# Patient Record
Sex: MALE | Race: WHITE | NOT HISPANIC OR LATINO | Employment: UNEMPLOYED | URBAN - METROPOLITAN AREA
[De-identification: names, ages, dates, MRNs, and addresses within clinical notes are randomized per-mention and may not be internally consistent; named-entity substitution may affect disease eponyms.]

---

## 2017-12-13 ENCOUNTER — OFFICE VISIT (OUTPATIENT)
Dept: FAMILY MEDICINE | Facility: HOSPITAL | Age: 62
End: 2017-12-13
Attending: FAMILY MEDICINE
Payer: MEDICARE

## 2017-12-13 VITALS
BODY MASS INDEX: 24.75 KG/M2 | WEIGHT: 148.56 LBS | HEART RATE: 70 BPM | DIASTOLIC BLOOD PRESSURE: 84 MMHG | SYSTOLIC BLOOD PRESSURE: 129 MMHG | HEIGHT: 65 IN

## 2017-12-13 DIAGNOSIS — F25.0 SCHIZOAFFECTIVE DISORDER, BIPOLAR TYPE: ICD-10-CM

## 2017-12-13 DIAGNOSIS — F31.9 BIPOLAR 1 DISORDER: ICD-10-CM

## 2017-12-13 DIAGNOSIS — Z76.89 ENCOUNTER TO ESTABLISH CARE WITH NEW DOCTOR: Primary | ICD-10-CM

## 2017-12-13 DIAGNOSIS — F41.9 ANXIETY: ICD-10-CM

## 2017-12-13 DIAGNOSIS — F33.9 EPISODE OF RECURRENT MAJOR DEPRESSIVE DISORDER, UNSPECIFIED DEPRESSION EPISODE SEVERITY: ICD-10-CM

## 2017-12-13 DIAGNOSIS — Z00.00 HEALTHCARE MAINTENANCE: ICD-10-CM

## 2017-12-13 DIAGNOSIS — F31.9 BIPOLAR DEPRESSION: Primary | ICD-10-CM

## 2017-12-13 PROBLEM — F32.9 MAJOR DEPRESSIVE DISORDER: Status: ACTIVE | Noted: 2017-12-13

## 2017-12-13 PROCEDURE — 99204 OFFICE O/P NEW MOD 45 MIN: CPT | Performed by: FAMILY MEDICINE

## 2017-12-13 RX ORDER — RISPERIDONE 25 MG/2 ML
25 KIT INTRAMUSCULAR
Qty: 2 VIAL | Refills: 3 | Status: SHIPPED | OUTPATIENT
Start: 2017-12-13 | End: 2018-04-03 | Stop reason: SDUPTHER

## 2017-12-13 RX ORDER — BETAMETHASONE DIPROPIONATE 0.5 MG/G
OINTMENT TOPICAL
COMMUNITY
Start: 2017-12-12

## 2017-12-13 RX ORDER — FLUCONAZOLE 200 MG/1
TABLET ORAL
COMMUNITY
Start: 2017-11-06 | End: 2017-12-13

## 2017-12-13 RX ORDER — TRAZODONE HYDROCHLORIDE 50 MG/1
TABLET ORAL
COMMUNITY
Start: 2017-12-04 | End: 2018-04-03 | Stop reason: SDUPTHER

## 2017-12-13 RX ORDER — DIVALPROEX SODIUM 250 MG/1
TABLET, DELAYED RELEASE ORAL
COMMUNITY
Start: 2017-11-24 | End: 2018-02-07 | Stop reason: SDUPTHER

## 2017-12-13 RX ORDER — GABAPENTIN 300 MG/1
CAPSULE ORAL
COMMUNITY
Start: 2017-11-24

## 2017-12-13 RX ORDER — RISPERIDONE 25 MG/2 ML
KIT INTRAMUSCULAR
COMMUNITY
Start: 2017-11-24 | End: 2017-12-13 | Stop reason: SDUPTHER

## 2017-12-13 NOTE — PROGRESS NOTES
Subjective:       Patient ID: Epifanio Puga is a 62 y.o. male.    Chief Complaint: Establish Care    62 y.o. Male with PMHx of depression and anxiety who is here to establish care after moving in with sister. He previously lived by himself, was on disability, and received assistance from home health in [place], [place]. Patient complains of rash on BL UE, BL LE, and abdomen that is improving with application of Betamethasone. Patient has history of unknow psychiatric problem which he was hospitalized for at [facility] in [place], [place], for 10 days after stating he wanted to kill himself. Today, patient reports compliance with all medications with assistance from his sister and denies any significant side effects. Patient denies SI, HI, and depression. He does state that he has experienced periods of time where he did not have to sleep for 72 hours and he reports periods with extreme energy. He denies any problems with his appetite currently. He states that his anxiety has been well controlled on the current medication regimen.       Review of Systems   Constitutional: Negative for unexpected weight change.   Respiratory: Negative for shortness of breath.    Cardiovascular: Negative for chest pain and palpitations.   Skin: Positive for rash.   Psychiatric/Behavioral: Negative for dysphoric mood and self-injury.   All other systems reviewed and are negative.      Objective:      Vitals:    12/13/17 1026   BP: 129/84   Pulse: 70     Physical Exam   Constitutional: He is oriented to person, place, and time. He appears well-developed and well-nourished. No distress.   HENT:   Head: Normocephalic and atraumatic.   Right Ear: External ear normal.   Left Ear: External ear normal.   Nose: Nose normal.   Mouth/Throat: Oropharynx is clear and moist.   Eyes: Conjunctivae and EOM are normal. Right eye exhibits no discharge. Left eye exhibits no discharge.   Neck: Normal range of motion. Neck supple.    Cardiovascular: Normal rate, regular rhythm, normal heart sounds and intact distal pulses.  Exam reveals no gallop and no friction rub.    No murmur heard.  Pulmonary/Chest: Effort normal and breath sounds normal. No respiratory distress. He has no wheezes. He has no rales. He exhibits no tenderness.   Abdominal: Soft. Bowel sounds are normal. He exhibits no distension. There is no tenderness.   Musculoskeletal: Normal range of motion. He exhibits no edema.   Neurological: He is alert and oriented to person, place, and time.   Skin: Skin is warm and dry. Rash noted.   Psychiatric: He has a normal mood and affect. His behavior is normal. Thought content normal. He is not actively hallucinating. He is attentive.       Assessment:       1. Encounter to establish care with new doctor    2. Episode of recurrent major depressive disorder, unspecified depression episode severity    3. Anxiety    4. Bipolar 1 disorder    5. Healthcare maintenance        Plan:       Encounter to establish care with new doctor    Episode of recurrent major depressive disorder, unspecified depression episode severity  -     Comprehensive metabolic panel; Future; Expected date: 12/13/2017  -     CBC auto differential; Future; Expected date: 12/13/2017  -     Cancel: Ambulatory referral to Psychiatry  -     RISPERDAL CONSTA 25 mg/2 mL injection; Inject 2 mLs (25 mg total) into the muscle every 14 (fourteen) days.  Dispense: 2 vial; Refill: 3  -     Ambulatory referral to Psychiatry    Anxiety  -     Comprehensive metabolic panel; Future; Expected date: 12/13/2017  -     CBC auto differential; Future; Expected date: 12/13/2017  -     Cancel: Ambulatory referral to Psychiatry  -     RISPERDAL CONSTA 25 mg/2 mL injection; Inject 2 mLs (25 mg total) into the muscle every 14 (fourteen) days.  Dispense: 2 vial; Refill: 3  -     Ambulatory referral to Psychiatry    Bipolar 1 disorder  -     Comprehensive metabolic panel; Future; Expected date:  12/13/2017  -     CBC auto differential; Future; Expected date: 12/13/2017  -     Cancel: Ambulatory referral to Psychiatry  -     RISPERDAL CONSTA 25 mg/2 mL injection; Inject 2 mLs (25 mg total) into the muscle every 14 (fourteen) days.  Dispense: 2 vial; Refill: 3  -     Ambulatory referral to Psychiatry    Healthcare maintenance  -     Hepatitis C antibody; Future; Expected date: 12/13/2017      Return in about 2 months (around 2/13/2018).        Pt discussed with Dr. George.    Home health will be initiated.    Lynne Ramirez MD

## 2018-01-09 ENCOUNTER — OFFICE VISIT (OUTPATIENT)
Dept: FAMILY MEDICINE | Facility: HOSPITAL | Age: 63
End: 2018-01-09
Attending: FAMILY MEDICINE
Payer: MEDICARE

## 2018-01-09 VITALS
HEIGHT: 65 IN | HEART RATE: 94 BPM | SYSTOLIC BLOOD PRESSURE: 130 MMHG | DIASTOLIC BLOOD PRESSURE: 82 MMHG | BODY MASS INDEX: 26.33 KG/M2 | WEIGHT: 158.06 LBS

## 2018-01-09 DIAGNOSIS — J06.9 VIRAL URI: Primary | ICD-10-CM

## 2018-01-09 PROCEDURE — 99213 OFFICE O/P EST LOW 20 MIN: CPT | Performed by: FAMILY MEDICINE

## 2018-01-09 NOTE — PROGRESS NOTES
Subjective:       Patient ID: Epifanio Puga is a 62 y.o. male.    Chief Complaint: Fever; Cough; Nasal Congestion; Insomnia; and Dizziness    Fever    Associated symptoms include congestion, coughing, headaches and nausea. Pertinent negatives include no abdominal pain, chest pain, diarrhea, ear pain, urinary pain, vomiting or wheezing.   Cough   Associated symptoms include chills, a fever, headaches, myalgias and rhinorrhea. Pertinent negatives include no chest pain, ear pain, shortness of breath or wheezing.   Dizziness:    Associated symptoms: a fever, headaches and nausea.no ear pain, no vomiting, no diaphoresis, no light-headedness and no chest pain.     Pt is a 62 ear old male with a PMHx of depression and anxiety who presents with a productive cough with clear sputum, congestion, and subjective fever since Sunday. Pt has not taken his temperature. Pt reports associated headache, chills, myalgias, nausea, and dizziness when he lays on his left side and is relieved with lying flat on his back or on his right side. He denies vomiting and sore throat. Pt has been taking thermaflu since Sunday without improvement. He states that yesterday his chills worsened, which prompted him to come into today. Pt denies any sick contacts. Pt received the flu shot in October.     Review of Systems   Constitutional: Positive for chills and fever. Negative for diaphoresis.   HENT: Positive for congestion and rhinorrhea. Negative for ear pain, sinus pain and sneezing.    Eyes: Negative for discharge and itching.   Respiratory: Positive for cough. Negative for shortness of breath and wheezing.    Cardiovascular: Negative for chest pain and leg swelling.   Gastrointestinal: Positive for nausea. Negative for abdominal distention, abdominal pain, constipation, diarrhea and vomiting.   Genitourinary: Negative for difficulty urinating, dysuria and hematuria.   Musculoskeletal: Positive for myalgias. Negative for arthralgias.    Neurological: Positive for dizziness and headaches. Negative for light-headedness and numbness.   Hematological: Negative for adenopathy.       Objective:      Vitals:    01/09/18 0953   BP: 130/82   Pulse: 94     Physical Exam   Constitutional: He appears well-developed and well-nourished.   HENT:   Head: Normocephalic and atraumatic.   Right Ear: Tympanic membrane normal.   Left Ear: Tympanic membrane normal.   Eyes: EOM are normal. Pupils are equal, round, and reactive to light.   Neck: Normal range of motion. Neck supple.   Cardiovascular: Normal rate and regular rhythm.    Pulmonary/Chest: Effort normal and breath sounds normal.   Abdominal: Soft. Bowel sounds are normal.   Skin: Skin is warm and dry.       Assessment:       1. Viral URI        Plan:       Viral URI  -flu swab negative  -pt remains afebrile  -may take OTC medicine    Return if symptoms worsen or fail to improve.      Patient discussed with Dr. George.    Lynne Ramirez MD

## 2018-02-07 ENCOUNTER — OFFICE VISIT (OUTPATIENT)
Dept: FAMILY MEDICINE | Facility: HOSPITAL | Age: 63
End: 2018-02-07
Attending: FAMILY MEDICINE
Payer: MEDICARE

## 2018-02-07 VITALS
SYSTOLIC BLOOD PRESSURE: 128 MMHG | BODY MASS INDEX: 25.75 KG/M2 | HEART RATE: 77 BPM | HEIGHT: 65 IN | DIASTOLIC BLOOD PRESSURE: 77 MMHG | WEIGHT: 154.56 LBS

## 2018-02-07 DIAGNOSIS — F33.41 RECURRENT MAJOR DEPRESSIVE DISORDER, IN PARTIAL REMISSION: Primary | ICD-10-CM

## 2018-02-07 DIAGNOSIS — F41.9 ANXIETY: ICD-10-CM

## 2018-02-07 PROCEDURE — 99213 OFFICE O/P EST LOW 20 MIN: CPT | Performed by: FAMILY MEDICINE

## 2018-02-07 RX ORDER — DIVALPROEX SODIUM 250 MG/1
250 TABLET, DELAYED RELEASE ORAL EVERY 12 HOURS
Qty: 180 TABLET | Refills: 0 | Status: SHIPPED | OUTPATIENT
Start: 2018-02-07 | End: 2018-04-03 | Stop reason: SDUPTHER

## 2018-02-07 NOTE — PROGRESS NOTES
Subjective:       Patient ID: Epifanio Puga is a 62 y.o. male.    Chief Complaint: Follow-up    HPI   62 year old male with a history of MDD and Anxiety that presents for follow up. His is with his sister. He is doing well per patient and sister. He denies any current complaints. He will see the Psych MD in April 2017. He is having problems sleeping however he states that he will wake up twice during the night to bring the dogs outside. I have encouraged him to set an alarm and bring dogs out once during the night and before he goes to sleep.     Review of Systems   Constitutional: Negative for fever.   HENT: Negative for sore throat.    Eyes: Negative for visual disturbance.   Respiratory: Negative for shortness of breath.    Cardiovascular: Negative for chest pain and palpitations.   Gastrointestinal: Negative for diarrhea and vomiting.   Skin: Negative for rash.   Neurological: Negative for headaches.   Psychiatric/Behavioral: Positive for sleep disturbance. Negative for behavioral problems and hallucinations.       Objective:      Vitals:    02/07/18 1049   BP: 128/77   Pulse: 77     Physical Exam   Constitutional: He is oriented to person, place, and time. He appears well-developed and well-nourished.   HENT:   Head: Normocephalic and atraumatic.   Eyes: Pupils are equal, round, and reactive to light.   Neck: Normal range of motion.   Cardiovascular: Normal rate and regular rhythm.    No murmur heard.  Pulmonary/Chest: Effort normal. No respiratory distress.   Abdominal: Soft. He exhibits no distension. There is no tenderness.   Musculoskeletal: Normal range of motion.   Neurological: He is alert and oriented to person, place, and time.   Skin: Skin is warm and dry.       Assessment:       1. Recurrent major depressive disorder, in partial remission    2. Anxiety        Plan:       Recurrent major depressive disorder, in partial remission  -     divalproex (DEPAKOTE) 250 MG EC tablet; Take 1 tablet (250 mg  total) by mouth every 12 (twelve) hours.  Dispense: 180 tablet; Refill: 0    Anxiety      Follow-up in about 3 months (around 5/7/2018).      Pt discussed with Dr. Reeves.    Lynne Ramirez MD

## 2018-02-08 NOTE — PROGRESS NOTES
I assume primary medical responsibility for this patient, I have reviewed the case history, findings, diagnosis and treatment plan with the resident and agree that the care is reasonable and necessary. This service has been performed by a resident without the presence of a teaching physician under the primary care exception  Marta Reeves  2/8/2018

## 2018-04-03 ENCOUNTER — HOSPITAL ENCOUNTER (OUTPATIENT)
Dept: CARDIOLOGY | Facility: CLINIC | Age: 63
Discharge: HOME | End: 2018-04-03
Payer: MEDICARE

## 2018-04-03 ENCOUNTER — OFFICE VISIT (OUTPATIENT)
Dept: PSYCHIATRY | Facility: CLINIC | Age: 63
End: 2018-04-03
Payer: MEDICARE

## 2018-04-03 VITALS
BODY MASS INDEX: 25.77 KG/M2 | HEART RATE: 72 BPM | SYSTOLIC BLOOD PRESSURE: 126 MMHG | WEIGHT: 154.88 LBS | DIASTOLIC BLOOD PRESSURE: 68 MMHG

## 2018-04-03 DIAGNOSIS — F79 INTELLECTUAL DISABILITY: ICD-10-CM

## 2018-04-03 DIAGNOSIS — F31.9 BIPOLAR 1 DISORDER: Primary | ICD-10-CM

## 2018-04-03 DIAGNOSIS — R06.09 DYSPNEA ON EXERTION: ICD-10-CM

## 2018-04-03 DIAGNOSIS — Z79.899 LONG TERM CURRENT USE OF ANTIPSYCHOTIC MEDICATION: ICD-10-CM

## 2018-04-03 DIAGNOSIS — G62.9 NEUROPATHY: ICD-10-CM

## 2018-04-03 DIAGNOSIS — G47.00 INSOMNIA, UNSPECIFIED TYPE: ICD-10-CM

## 2018-04-03 DIAGNOSIS — F41.9 ANXIETY: ICD-10-CM

## 2018-04-03 DIAGNOSIS — F22 PARANOIA: ICD-10-CM

## 2018-04-03 DIAGNOSIS — R14.0 ABDOMINAL DISTENSION: ICD-10-CM

## 2018-04-03 PROCEDURE — 93010 ELECTROCARDIOGRAM REPORT: CPT | Mod: S$PBB,,, | Performed by: INTERNAL MEDICINE

## 2018-04-03 PROCEDURE — 99999 PR PBB SHADOW E&M-EST. PATIENT-LVL III: CPT | Mod: PBBFAC,,, | Performed by: INTERNAL MEDICINE

## 2018-04-03 PROCEDURE — 90792 PSYCH DIAG EVAL W/MED SRVCS: CPT | Mod: PBBFAC | Performed by: INTERNAL MEDICINE

## 2018-04-03 PROCEDURE — 99213 OFFICE O/P EST LOW 20 MIN: CPT | Mod: PBBFAC,25 | Performed by: INTERNAL MEDICINE

## 2018-04-03 PROCEDURE — 90792 PSYCH DIAG EVAL W/MED SRVCS: CPT | Mod: S$PBB,,, | Performed by: INTERNAL MEDICINE

## 2018-04-03 PROCEDURE — 93005 ELECTROCARDIOGRAM TRACING: CPT | Mod: PBBFAC | Performed by: INTERNAL MEDICINE

## 2018-04-03 RX ORDER — DIVALPROEX SODIUM 250 MG/1
250 TABLET, DELAYED RELEASE ORAL EVERY 12 HOURS
Qty: 180 TABLET | Refills: 0 | Status: SHIPPED | OUTPATIENT
Start: 2018-04-03

## 2018-04-03 RX ORDER — RISPERIDONE 25 MG/2 ML
25 KIT INTRAMUSCULAR
Qty: 2 VIAL | Refills: 3 | Status: SHIPPED | OUTPATIENT
Start: 2018-04-03

## 2018-04-03 RX ORDER — TRAZODONE HYDROCHLORIDE 50 MG/1
50 TABLET ORAL NIGHTLY PRN
Qty: 90 TABLET | Refills: 0 | Status: SHIPPED | OUTPATIENT
Start: 2018-04-03

## 2018-04-03 NOTE — Clinical Note
Dr. Ramirez,  I wanted to let you know that I saw this patient of yours in Psychiatry.  I had trouble gathering history but have requested records from his previous PCP (Dr. Padilla) and from [facility] (where he was hospitalized).  His sister mentioned you have tried to get records as well - have you had any success?  Also, he was complaining of neuropathy, abdominal distention, and WADE when I saw him - I ordered some basic labs so that you can have them prior to seeing him next week.  Sister did tell me that he was on supplemental oxygen at some point in the past, however they both deny history of heart disease or lung disease.  Hopefully we will understand this more once we get records.  Feel free to reach out to me anytime.  Thanks so much, Tamiko Javed
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
## 2018-04-04 PROBLEM — F79 INTELLECTUAL DISABILITY: Status: ACTIVE | Noted: 2018-04-04

## 2018-04-04 PROBLEM — R14.0 ABDOMINAL DISTENSION: Status: ACTIVE | Noted: 2018-04-04

## 2018-04-04 PROBLEM — G62.9 NEUROPATHY: Status: ACTIVE | Noted: 2018-04-04

## 2018-04-04 PROBLEM — Z79.899 LONG TERM CURRENT USE OF ANTIPSYCHOTIC MEDICATION: Status: ACTIVE | Noted: 2018-04-04

## 2018-04-04 PROBLEM — F22 PARANOIA: Status: ACTIVE | Noted: 2018-04-04

## 2018-04-04 PROBLEM — F31.9 BIPOLAR 1 DISORDER: Status: ACTIVE | Noted: 2018-04-04

## 2018-04-04 PROBLEM — G47.00 INSOMNIA: Status: ACTIVE | Noted: 2018-04-04

## 2018-04-04 PROBLEM — R06.09 DYSPNEA ON EXERTION: Status: ACTIVE | Noted: 2018-04-04

## 2018-04-06 ENCOUNTER — TELEPHONE (OUTPATIENT)
Dept: INTERNAL MEDICINE | Facility: CLINIC | Age: 63
End: 2018-04-06

## 2018-04-06 NOTE — TELEPHONE ENCOUNTER
----- Message from Lashell Cazares sent at 4/6/2018 11:12 AM CDT -----  Contact: Dulce from Dr Padilla [phone] option 1  Please call Dulce from Dr Padilla, she a quick question regarding the medical record request they received please call at [phone] option 1.    Thank you

## 2018-04-10 ENCOUNTER — OFFICE VISIT (OUTPATIENT)
Dept: FAMILY MEDICINE | Facility: HOSPITAL | Age: 63
End: 2018-04-10
Attending: FAMILY MEDICINE
Payer: MEDICARE

## 2018-04-10 VITALS
HEIGHT: 65 IN | SYSTOLIC BLOOD PRESSURE: 125 MMHG | HEART RATE: 74 BPM | DIASTOLIC BLOOD PRESSURE: 85 MMHG | WEIGHT: 154.56 LBS | BODY MASS INDEX: 25.75 KG/M2

## 2018-04-10 DIAGNOSIS — F31.9 BIPOLAR 1 DISORDER: Primary | ICD-10-CM

## 2018-04-10 DIAGNOSIS — F79 INTELLECTUAL DISABILITY: ICD-10-CM

## 2018-04-10 DIAGNOSIS — F41.9 ANXIETY: ICD-10-CM

## 2018-04-10 PROCEDURE — 99213 OFFICE O/P EST LOW 20 MIN: CPT | Performed by: FAMILY MEDICINE

## 2018-04-10 NOTE — PROGRESS NOTES
I assume primary medical responsibility for this patient, I have reviewed the case history, findings, diagnosis and treatment plan with the resident and agree that the care is reasonable and necessary. This service has been performed by a resident without the presence of a teaching physician under the primary care exception  Marta Reeves  4/10/2018

## 2018-04-10 NOTE — PROGRESS NOTES
Subjective:       Patient ID: Epifanio Puga is a 62 y.o. male.    Chief Complaint: Bloated (medication)    HPI   Mr. Puga is a 61 y/o male with past medical history of Bipolar I, anxiety, and insomnia managed by Psychiatry presenting to family medicine clinic with complaint of abdominal bloating. He is taking Depakote 250mg, Risperdal 25mg/2Ml injection, and trazodone 50mg. He states that he feels abdominal bloating following his Risperdal shot which is relived after his evening walking with passing of flatus. He is accompanied by his sister who states that he is taking his medicine as prescribed and that his symptoms are adequately prescribed. Valproic Acid level on 4/3/18 was low and patient is scheduled to follow up with psychiatry. He is moving [place] in June or July of [place] and is not expected to return to this clinic.    Review of Systems   Constitutional: Negative for chills, fatigue and fever.   HENT: Negative for sore throat and trouble swallowing.    Eyes: Negative for pain, discharge and redness.   Respiratory: Negative for cough, shortness of breath and wheezing.    Cardiovascular: Negative for chest pain.   Gastrointestinal: Negative for abdominal pain, blood in stool, constipation, diarrhea, nausea and vomiting.   Endocrine: Negative for polyuria.   Genitourinary: Negative for difficulty urinating, dysuria and hematuria.   Musculoskeletal: Negative for back pain.   Skin: Positive for rash.   Neurological: Negative for dizziness, seizures, syncope, weakness, numbness and headaches.   Psychiatric/Behavioral: Negative for agitation, behavioral problems, confusion, decreased concentration, hallucinations, sleep disturbance and suicidal ideas. The patient is not hyperactive.        Objective:      Vitals:    04/10/18 0919   BP: 125/85   Pulse: 74     Physical Exam   Constitutional: He is oriented to person, place, and time. He appears  well-developed and well-nourished. No distress.   HENT:   Head: Normocephalic and atraumatic.   Eyes: Conjunctivae and EOM are normal. Right eye exhibits no discharge. Left eye exhibits no discharge. No scleral icterus.   Neck: Normal range of motion. Neck supple. No JVD present.   Cardiovascular: Normal rate, regular rhythm, normal heart sounds and intact distal pulses.  Exam reveals no gallop.    No murmur heard.  Pulmonary/Chest: Effort normal and breath sounds normal. No respiratory distress.   Abdominal: Soft. Bowel sounds are normal. There is no rebound and no guarding.   Musculoskeletal: Normal range of motion.   Neurological: He is alert and oriented to person, place, and time.   Skin: Skin is warm and dry. Capillary refill takes less than 2 seconds. Rash noted. He is not diaphoretic.   Psychiatric: He has a normal mood and affect. His behavior is normal. Judgment and thought content normal.       Assessment:       Mr. Puga is a 62 year old male with past medical history of bipolar I, anxiety, and insomnia presenting with abdominal bloating after his monthly risperdal leti which is relieved by his evening walk with passing of flatus that night.  Plan:       Bipolar 1 disorder  -continue current regimen of Depakote, Risperdal, Trazodone  -will follow up with Psych in one month  -Depakote level subtherapeutic, however will leave as current dose. Family will verify that medication is taken appropriately.    Anxiety    Intellectual disability      Follow-up in about 3 months (around 7/10/2018).    -patient moving to [facility] due to [place] and not expected to return to clinic    Lynne Ramirez MD

## 2018-04-11 ENCOUNTER — TELEPHONE (OUTPATIENT)
Dept: PSYCHIATRY | Facility: CLINIC | Age: 63
End: 2018-04-11

## 2018-08-11 DIAGNOSIS — F31.9 BIPOLAR 1 DISORDER: ICD-10-CM

## 2018-08-13 NOTE — TELEPHONE ENCOUNTER
I received refill request for this patient, however he never showed up to his follow up appointment.  I believe he was considering moving.  Please call him to see if he plans to continue seeing me in the future.

## 2018-08-15 DIAGNOSIS — F31.9 BIPOLAR 1 DISORDER: ICD-10-CM

## 2018-08-16 RX ORDER — DIVALPROEX SODIUM 250 MG/1
TABLET, DELAYED RELEASE ORAL
Qty: 180 TABLET | Refills: 0 | OUTPATIENT
Start: 2018-08-16